# Patient Record
Sex: MALE | Race: WHITE | Employment: FULL TIME | URBAN - METROPOLITAN AREA
[De-identification: names, ages, dates, MRNs, and addresses within clinical notes are randomized per-mention and may not be internally consistent; named-entity substitution may affect disease eponyms.]

---

## 2020-10-03 ENCOUNTER — HOSPITAL ENCOUNTER (EMERGENCY)
Age: 30
Discharge: HOME | End: 2020-10-03
Attending: EMERGENCY MEDICINE

## 2020-10-03 VITALS
HEIGHT: 70 IN | RESPIRATION RATE: 16 BRPM | DIASTOLIC BLOOD PRESSURE: 85 MMHG | TEMPERATURE: 98 F | HEART RATE: 103 BPM | OXYGEN SATURATION: 98 % | BODY MASS INDEX: 23.62 KG/M2 | WEIGHT: 165 LBS | SYSTOLIC BLOOD PRESSURE: 149 MMHG

## 2020-10-03 DIAGNOSIS — L02.416 CELLULITIS AND ABSCESS OF LEFT LEG: Primary | ICD-10-CM

## 2020-10-03 DIAGNOSIS — L03.116 CELLULITIS AND ABSCESS OF LEFT LEG: Primary | ICD-10-CM

## 2020-10-03 PROCEDURE — 99282 EMERGENCY DEPT VISIT SF MDM: CPT

## 2020-10-03 PROCEDURE — 75810000289 HC I&D ABSCESS SIMP/COMP/MULT

## 2020-10-03 RX ORDER — MELOXICAM 15 MG/1
15 TABLET ORAL
Qty: 10 TAB | Refills: 0 | Status: SHIPPED | OUTPATIENT
Start: 2020-10-03

## 2020-10-03 RX ORDER — CLINDAMYCIN HYDROCHLORIDE 150 MG/1
300 CAPSULE ORAL EVERY 6 HOURS
Qty: 56 CAP | Refills: 0 | Status: SHIPPED | OUTPATIENT
Start: 2020-10-03 | End: 2020-10-10

## 2020-10-04 NOTE — ED NOTES
I have reviewed discharge instructions with the patient. The patient verbalized understanding. Patient left ED via Discharge Method: ambulatory to Home with (self). Opportunity for questions and clarification provided. Patient given 2 scripts. To continue your aftercare when you leave the hospital, you may receive an automated call from our care team to check in on how you are doing. This is a free service and part of our promise to provide the best care and service to meet your aftercare needs.  If you have questions, or wish to unsubscribe from this service please call [phone]. Thank you for Choosing our [facility] Emergency Department.

## 2020-10-04 NOTE — ED PROVIDER NOTES
With abscess on his left knee. Patient states he has been there for about a week and gradually getting worse. He has tried to open the wound and it has drained some. He denies any injury. He states it started as an ingrown hair. He is a . He states the swelling and pain make it painful to bend his knee but he has no difficulty doing range of motion when asked. The history is provided by the patient. Skin Problem    This is a new problem. The current episode started more than 1 week ago. The problem has been gradually worsening. There has been no fever. No past medical history on file. No past surgical history on file. No family history on file.     Social History     Socioeconomic History    Marital status: SINGLE     Spouse name: Not on file    Number of children: Not on file    Years of education: Not on file    Highest education level: Not on file   Occupational History    Not on file   Social Needs    Financial resource strain: Not on file    Food insecurity     Worry: Not on file     Inability: Not on file    Transportation needs     Medical: Not on file     Non-medical: Not on file   Tobacco Use    Smoking status: Not on file   Substance and Sexual Activity    Alcohol use: Not on file    Drug use: Not on file    Sexual activity: Not on file   Lifestyle    Physical activity     Days per week: Not on file     Minutes per session: Not on file    Stress: Not on file   Relationships    Social connections     Talks on phone: Not on file     Gets together: Not on file     Attends Worship service: Not on file     Active member of club or organization: Not on file     Attends meetings of clubs or organizations: Not on file     Relationship status: Not on file    Intimate partner violence     Fear of current or ex partner: Not on file     Emotionally abused: Not on file     Physically abused: Not on file     Forced sexual activity: Not on file   Other Topics Concern    Not on file   Social History Narrative    Not on file         ALLERGIES: Patient has no known allergies. Review of Systems   Constitutional: Negative for chills and fever. Musculoskeletal: Negative for joint swelling. Skin: Positive for color change and wound. All other systems reviewed and are negative. Vitals:    10/03/20 2137   BP: (!) 149/85   Pulse: (!) 103   Resp: 16   Temp: 98 °F (36.7 °C)   SpO2: 98%   Weight: 74.8 kg (165 lb)   Height: 5' 10\" (1.778 m)            Physical Exam  Vitals signs and nursing note reviewed. Constitutional:       General: He is not in acute distress. Appearance: Normal appearance. He is well-developed. He is not ill-appearing or diaphoretic. HENT:      Head: Normocephalic and atraumatic. Eyes:      General:         Right eye: No discharge. Left eye: No discharge. Conjunctiva/sclera: Conjunctivae normal.   Neck:      Musculoskeletal: Normal range of motion and neck supple. Abdominal:      Tenderness: There is no abdominal tenderness. Musculoskeletal: Normal range of motion. General: Swelling and tenderness present. Left lower leg: No edema. Comments: He has a 2 cm abscess on the skin overlying the left patella. He has some mild erythema surrounding the abscess with some mild distal erythema into the left lower extremity. There is no extensive swelling. Abscess has some drainage but still underlying fluctuance. There is full range of motion of his left knee   Skin:     General: Skin is warm and dry. Capillary Refill: Capillary refill takes less than 2 seconds. Neurological:      Mental Status: He is alert and oriented to person, place, and time. Cranial Nerves: No cranial nerve deficit.    Psychiatric:         Behavior: Behavior normal.          MDM  Number of Diagnoses or Management Options  Cellulitis and abscess of left leg:   Diagnosis management comments: Abscess was I&D patient was told to return 2 days for reevaluation. He was given clindamycin. Told to stay off his knees during his work as a . He has full range of motion of the knee I do not think he has a septic joint. Risk of Complications, Morbidity, and/or Mortality  Presenting problems: moderate  Diagnostic procedures: low  Management options: moderate    Patient Progress  Patient progress: improved         I&D Abcess Simple    Date/Time: 10/3/2020 10:30 PM  Performed by: Eldon Hoyt MD  Authorized by: Eldon Hoyt MD     Consent:     Consent obtained:  Verbal    Consent given by:  Patient  Location:     Type:  Abscess    Size:  2 cm    Location:  Lower extremity    Lower extremity location:  Knee    Knee location:  L knee  Pre-procedure details:     Skin preparation:  Betadine  Anesthesia (see MAR for exact dosages): Anesthesia method:  Local infiltration    Local anesthetic:  Lidocaine 1% WITH epi  Procedure type:     Complexity:  Simple  Procedure details:     Needle aspiration: no      Incision types:  Single straight    Incision depth:  Subcutaneous    Scalpel blade:  11    Wound management:  Probed and deloculated    Drainage:  Purulent    Drainage amount: Moderate    Wound treatment:  Wound left open    Packing materials:  None  Post-procedure details:     Patient tolerance of procedure:   Tolerated well, no immediate complications

## 2020-10-04 NOTE — ED TRIAGE NOTES
Arrives with face mask in place. Reports left knee pain and swelling with wound to site. States drained multiple times over past week. Onset approx 1 week pta. Denies fever/chills. Redness and swelling to site.

## 2020-10-24 ENCOUNTER — HOSPITAL ENCOUNTER (EMERGENCY)
Age: 30
Discharge: HOME | End: 2020-10-24
Attending: EMERGENCY MEDICINE

## 2020-10-24 ENCOUNTER — APPOINTMENT (OUTPATIENT)
Dept: GENERAL RADIOLOGY | Age: 30
End: 2020-10-24
Attending: EMERGENCY MEDICINE

## 2020-10-24 VITALS
RESPIRATION RATE: 20 BRPM | WEIGHT: 175 LBS | BODY MASS INDEX: 25.05 KG/M2 | OXYGEN SATURATION: 99 % | HEIGHT: 70 IN | DIASTOLIC BLOOD PRESSURE: 90 MMHG | SYSTOLIC BLOOD PRESSURE: 149 MMHG | HEART RATE: 124 BPM | TEMPERATURE: 98.4 F

## 2020-10-24 DIAGNOSIS — T40.1X4A HEROIN OVERDOSE, UNDETERMINED INTENT, INITIAL ENCOUNTER (HCC): Primary | ICD-10-CM

## 2020-10-24 LAB
ALBUMIN SERPL-MCNC: 4.5 G/DL (ref 3.5–5)
ALBUMIN/GLOB SERPL: 1 {RATIO} (ref 1.2–3.5)
ALP SERPL-CCNC: 54 U/L (ref 50–136)
ALT SERPL-CCNC: 25 U/L (ref 12–65)
ANION GAP SERPL CALC-SCNC: 5 MMOL/L (ref 7–16)
AST SERPL-CCNC: 49 U/L (ref 15–37)
ATRIAL RATE: 124 BPM
BASOPHILS # BLD: 0.1 K/UL (ref 0–0.2)
BASOPHILS NFR BLD: 0 % (ref 0–2)
BILIRUB SERPL-MCNC: 1.4 MG/DL (ref 0.2–1.1)
BUN SERPL-MCNC: 16 MG/DL (ref 6–23)
CALCIUM SERPL-MCNC: 9.2 MG/DL (ref 8.3–10.4)
CALCULATED P AXIS, ECG09: 69 DEGREES
CALCULATED R AXIS, ECG10: 108 DEGREES
CALCULATED T AXIS, ECG11: 58 DEGREES
CHLORIDE SERPL-SCNC: 106 MMOL/L (ref 98–107)
CO2 SERPL-SCNC: 27 MMOL/L (ref 21–32)
CREAT SERPL-MCNC: 1.43 MG/DL (ref 0.8–1.5)
DIAGNOSIS, 93000: NORMAL
DIFFERENTIAL METHOD BLD: ABNORMAL
EOSINOPHIL # BLD: 0 K/UL (ref 0–0.8)
EOSINOPHIL NFR BLD: 0 % (ref 0.5–7.8)
ERYTHROCYTE [DISTWIDTH] IN BLOOD BY AUTOMATED COUNT: 13.2 % (ref 11.9–14.6)
ETHANOL SERPL-MCNC: 5 MG/DL
GLOBULIN SER CALC-MCNC: 4.6 G/DL (ref 2.3–3.5)
GLUCOSE SERPL-MCNC: 161 MG/DL (ref 65–100)
HCT VFR BLD AUTO: 42.1 % (ref 41.1–50.3)
HGB BLD-MCNC: 14.4 G/DL (ref 13.6–17.2)
IMM GRANULOCYTES # BLD AUTO: 0.2 K/UL (ref 0–0.5)
IMM GRANULOCYTES NFR BLD AUTO: 1 % (ref 0–5)
LYMPHOCYTES # BLD: 1.1 K/UL (ref 0.5–4.6)
LYMPHOCYTES NFR BLD: 6 % (ref 13–44)
MAGNESIUM SERPL-MCNC: 2.3 MG/DL (ref 1.8–2.4)
MCH RBC QN AUTO: 29.9 PG (ref 26.1–32.9)
MCHC RBC AUTO-ENTMCNC: 34.2 G/DL (ref 31.4–35)
MCV RBC AUTO: 87.3 FL (ref 79.6–97.8)
MONOCYTES # BLD: 1.1 K/UL (ref 0.1–1.3)
MONOCYTES NFR BLD: 6 % (ref 4–12)
NEUTS SEG # BLD: 15.7 K/UL (ref 1.7–8.2)
NEUTS SEG NFR BLD: 86 % (ref 43–78)
NRBC # BLD: 0 K/UL (ref 0–0.2)
P-R INTERVAL, ECG05: 142 MS
PLATELET # BLD AUTO: 219 K/UL (ref 150–450)
PMV BLD AUTO: 10.8 FL (ref 9.4–12.3)
POTASSIUM SERPL-SCNC: 4.9 MMOL/L (ref 3.5–5.1)
PROT SERPL-MCNC: 9.1 G/DL (ref 6.3–8.2)
Q-T INTERVAL, ECG07: 318 MS
QRS DURATION, ECG06: 100 MS
QTC CALCULATION (BEZET), ECG08: 456 MS
RBC # BLD AUTO: 4.82 M/UL (ref 4.23–5.6)
SODIUM SERPL-SCNC: 138 MMOL/L (ref 136–145)
VENTRICULAR RATE, ECG03: 124 BPM
WBC # BLD AUTO: 18.2 K/UL (ref 4.3–11.1)

## 2020-10-24 PROCEDURE — 85025 COMPLETE CBC W/AUTO DIFF WBC: CPT

## 2020-10-24 PROCEDURE — 80307 DRUG TEST PRSMV CHEM ANLYZR: CPT

## 2020-10-24 PROCEDURE — 93005 ELECTROCARDIOGRAM TRACING: CPT | Performed by: EMERGENCY MEDICINE

## 2020-10-24 PROCEDURE — 83735 ASSAY OF MAGNESIUM: CPT

## 2020-10-24 PROCEDURE — 80053 COMPREHEN METABOLIC PANEL: CPT

## 2020-10-24 PROCEDURE — 71045 X-RAY EXAM CHEST 1 VIEW: CPT

## 2020-10-24 PROCEDURE — 74011250636 HC RX REV CODE- 250/636: Performed by: EMERGENCY MEDICINE

## 2020-10-24 PROCEDURE — 99285 EMERGENCY DEPT VISIT HI MDM: CPT

## 2020-10-24 RX ADMIN — SODIUM CHLORIDE 1000 ML: 900 INJECTION, SOLUTION INTRAVENOUS at 01:26

## 2020-10-24 NOTE — ED TRIAGE NOTES
Found about [phone] lying supine in a parking lot of a convenience store. Patient diaphoretic, shallow breathing. EMS on scene gave 2mg IM narcan. Patient arose within 5 min and now aox4. Patient wearing mask. The beqom present at bedside. Warrants out for patient. Patient states he snorted heroin.

## 2020-10-24 NOTE — ED NOTES
I have reviewed discharge instructions with the patient. The patient verbalized understanding. Patient left ED via Discharge Method: ambulatory with . DC to detention. Opportunity for questions and clarification provided. Patient given 0 scripts. To continue your aftercare when you leave the hospital, you may receive an automated call from our care team to check in on how you are doing. This is a free service and part of our promise to provide the best care and service to meet your aftercare needs.  If you have questions, or wish to unsubscribe from this service please call [phone]. Thank you for Choosing our [facility] Emergency Department.

## 2020-10-24 NOTE — ED PROVIDER NOTES
Patient presents to the ER for presumed opiate overdose. EMS was called to find patient unresponsive, was treated with 2 mg of Narcan IV approximately an hour prior to arrival with immediate improvement in symptoms. Patient reports he did snort heroin approximately $2 worth prior to this happening. Reports alcohol intake as well. Denies any other drug intake. EMS reports vital signs stable in route after being administered Narcan other than tachycardia. Patient denies any other prescription drug use. The history is provided by the patient. Drug Overdose   This is a new problem. The current episode started 1 to 2 hours ago. Associated symptoms include shortness of breath. Pertinent negatives include no chest pain and no abdominal pain. He has tried nothing for the symptoms. No past medical history on file. No past surgical history on file. No family history on file.     Social History     Socioeconomic History    Marital status: SINGLE     Spouse name: Not on file    Number of children: Not on file    Years of education: Not on file    Highest education level: Not on file   Occupational History    Not on file   Social Needs    Financial resource strain: Not on file    Food insecurity     Worry: Not on file     Inability: Not on file    Transportation needs     Medical: Not on file     Non-medical: Not on file   Tobacco Use    Smoking status: Not on file   Substance and Sexual Activity    Alcohol use: Not on file    Drug use: Not on file    Sexual activity: Not on file   Lifestyle    Physical activity     Days per week: Not on file     Minutes per session: Not on file    Stress: Not on file   Relationships    Social connections     Talks on phone: Not on file     Gets together: Not on file     Attends Quaker service: Not on file     Active member of club or organization: Not on file     Attends meetings of clubs or organizations: Not on file     Relationship status: Not on file    Intimate partner violence     Fear of current or ex partner: Not on file     Emotionally abused: Not on file     Physically abused: Not on file     Forced sexual activity: Not on file   Other Topics Concern    Not on file   Social History Narrative    Not on file         ALLERGIES: Patient has no known allergies. Review of Systems   Constitutional: Negative for fatigue and unexpected weight change. HENT: Negative for congestion. Eyes: Negative for photophobia and redness. Respiratory: Positive for shortness of breath. Negative for chest tightness. Cardiovascular: Negative for chest pain. Gastrointestinal: Negative for abdominal pain. Endocrine: Negative for polyphagia and polyuria. All other systems reviewed and are negative. There were no vitals filed for this visit. Physical Exam  Vitals signs and nursing note reviewed. Constitutional:       Appearance: Normal appearance. HENT:      Head: Normocephalic and atraumatic. Nose: Nose normal.      Mouth/Throat:      Mouth: Mucous membranes are moist.      Pharynx: No oropharyngeal exudate or posterior oropharyngeal erythema. Eyes:      Extraocular Movements: Extraocular movements intact. Conjunctiva/sclera: Conjunctivae normal.      Pupils: Pupils are equal, round, and reactive to light. Neck:      Musculoskeletal: Normal range of motion. No neck rigidity or muscular tenderness. Vascular: No carotid bruit. Cardiovascular:      Rate and Rhythm: Regular rhythm. Tachycardia present. Pulses: Normal pulses. Heart sounds: Normal heart sounds. No murmur. No gallop. Pulmonary:      Effort: Pulmonary effort is normal. No respiratory distress. Breath sounds: Normal breath sounds. No stridor. No rhonchi. Abdominal:      General: Abdomen is flat. Bowel sounds are normal. There is no distension. Palpations: Abdomen is soft. Tenderness: There is no abdominal tenderness. Musculoskeletal: Normal range of motion. General: No swelling, tenderness or deformity. Right lower leg: No edema. Left lower leg: No edema. Lymphadenopathy:      Cervical: No cervical adenopathy. Skin:     General: Skin is warm and dry. Capillary Refill: Capillary refill takes less than 2 seconds. Coloration: Skin is not jaundiced. Neurological:      General: No focal deficit present. Mental Status: He is alert. Mental status is at baseline. MDM  Number of Diagnoses or Management Options  Diagnosis management comments: Patient appears to be in a sinus tachycardia. Plan to obtain basic labs, start IV fluids, obtain UDS. Obtain chest x-ray as well. Monitor symptoms here    2:16 AM  Labs show white count of 18,000. Chest x-ray clear without any infiltrates. Patient remains well-appearing here without any hypoxia. Plan to discharge home, given heroin overdose precautions.          Amount and/or Complexity of Data Reviewed  Clinical lab tests: ordered and reviewed  Tests in the radiology section of CPT®: ordered and reviewed    Risk of Complications, Morbidity, and/or Mortality  Presenting problems: high  Diagnostic procedures: moderate  Management options: moderate    Patient Progress  Patient progress: stable         Procedures        Results Include:    Recent Results (from the past 24 hour(s))   CBC WITH AUTOMATED DIFF    Collection Time: 10/24/20  1:22 AM   Result Value Ref Range    WBC 18.2 (H) 4.3 - 11.1 K/uL    RBC 4.82 4.23 - 5.6 M/uL    HGB 14.4 13.6 - 17.2 g/dL    HCT 42.1 41.1 - 50.3 %    MCV 87.3 79.6 - 97.8 FL    MCH 29.9 26.1 - 32.9 PG    MCHC 34.2 31.4 - 35.0 g/dL    RDW 13.2 11.9 - 14.6 %    PLATELET 084 454 - 930 K/uL    MPV 10.8 9.4 - 12.3 FL    ABSOLUTE NRBC 0.00 0.0 - 0.2 K/uL    DF AUTOMATED      NEUTROPHILS 86 (H) 43 - 78 %    LYMPHOCYTES 6 (L) 13 - 44 %    MONOCYTES 6 4.0 - 12.0 %    EOSINOPHILS 0 (L) 0.5 - 7.8 %    BASOPHILS 0 0.0 - 2.0 %    IMMATURE GRANULOCYTES 1 0.0 - 5.0 %    ABS. NEUTROPHILS 15.7 (H) 1.7 - 8.2 K/UL    ABS. LYMPHOCYTES 1.1 0.5 - 4.6 K/UL    ABS. MONOCYTES 1.1 0.1 - 1.3 K/UL    ABS. EOSINOPHILS 0.0 0.0 - 0.8 K/UL    ABS. BASOPHILS 0.1 0.0 - 0.2 K/UL    ABS. IMM. GRANS. 0.2 0.0 - 0.5 K/UL   METABOLIC PANEL, COMPREHENSIVE    Collection Time: 10/24/20  1:22 AM   Result Value Ref Range    Sodium 138 136 - 145 mmol/L    Potassium 4.9 3.5 - 5.1 mmol/L    Chloride 106 98 - 107 mmol/L    CO2 27 21 - 32 mmol/L    Anion gap 5 (L) 7 - 16 mmol/L    Glucose 161 (H) 65 - 100 mg/dL    BUN 16 6 - 23 MG/DL    Creatinine 1.43 0.8 - 1.5 MG/DL    GFR est AA >60 >60 ml/min/1.73m2    GFR est non-AA >60 >60 ml/min/1.73m2    Calcium 9.2 8.3 - 10.4 MG/DL    Bilirubin, total 1.4 (H) 0.2 - 1.1 MG/DL    ALT (SGPT) 25 12 - 65 U/L    AST (SGOT) 49 (H) 15 - 37 U/L    Alk. phosphatase 54 50 - 136 U/L    Protein, total 9.1 (H) 6.3 - 8.2 g/dL    Albumin 4.5 3.5 - 5.0 g/dL    Globulin 4.6 (H) 2.3 - 3.5 g/dL    A-G Ratio 1.0 (L) 1.2 - 3.5     MAGNESIUM    Collection Time: 10/24/20  1:22 AM   Result Value Ref Range    Magnesium 2.3 1.8 - 2.4 mg/dL   ETHYL ALCOHOL    Collection Time: 10/24/20  1:22 AM   Result Value Ref Range    ALCOHOL(ETHYL),SERUM 5 MG/DL     Voice dictation software was used during the making of this note. This software is not perfect and grammatical and other typographical errors may be present. This note has been proofread, but may still contain errors.   Broderick Nichole MD; 10/24/2020 @2:17 AM   ===================================================================

## 2020-10-24 NOTE — DISCHARGE INSTRUCTIONS
Follow-up with outpatient drug treatment center  Return to the ER for any new, worsening or life-threatening symptoms      Opioid Overdose: Care Instructions  Your Care Instructions     You have had treatment to help your body recover from taking too much of an opioid. You are getting better, but you may not feel well for a while. It takes time for the opioids to leave your body. How long it takes to feel better depends on which drug you took and how much you took of it. Opioids include illegal drugs such as heroin, often called smack, junk, H, and ska. Opioids also include medicines that doctors prescribe to treat pain. These are medicines such as oxycodone, methadone, and buprenorphine. They are sometimes sold and used illegally. Taking too much of an opioid can be dangerous. It may cause:  · Trouble breathing. · Low blood pressure. · A low heart rate. · A coma. When the doctor treated you for the overdose, he or she may have:  · Watched your symptoms or done tests to find out what kind of drug you took. · Given you fluids. · Given you oxygen to help you breathe. · Given you a medicine called naloxone to help reverse the effects of the opioid. · Done several tests, including blood tests, to see how you're responding to treatment. The doctor also watched you carefully to make sure you were recovering safely. Follow-up care is a key part of your treatment and safety. Be sure to make and go to all appointments, and call your doctor if you are having problems. It's also a good idea to know your test results and keep a list of the medicines you take. How can you care for yourself at home? · If you take opioids regularly, your body gets used to them. This is called physical dependence. If you are physically dependent on opioids, you may have withdrawal symptoms when you stop using them or use less. These symptoms can include nausea, sweating, chills, diarrhea, stomach cramps, and muscle aches. Withdrawal can last up to several weeks, depending on which opioid you took and how long you took it. You may feel very ill, but you probably aren't in medical danger. · Your doctor may give you medicine to help you feel better. To help you get through withdrawal, you can also:  ? Get plenty of rest.  ? Drink plenty of fluids. ? Stay active. ? Eat a healthy diet. · If you had a tube in your throat to help you breathe, you may have a sore throat or hoarseness that can last a few days. Sip liquids to help soothe your throat. · Do not drink alcohol or take illegal drugs. · Do not take medicines that make you tired, like sleeping pills or muscle relaxers. · Do not drive if you feel sleepy or groggy while you recover from an overdose. · Get help to stop using opioids. Talk to your doctor about substance use treatment programs. · Talk to your doctor or pharmacist about having a naloxone rescue kit on hand. When should you call for help? Call 911 anytime you think you may need emergency care. For example, call if:    · You feel you cannot stop from hurting yourself or someone else. Call your doctor now or seek immediate medical care if:    · You have new or worse withdrawal symptoms, such as:  ? Stomach cramps. ? Vomiting. ? Diarrhea. ? Muscle aches. ? Sweating. Watch closely for changes in your health, and be sure to contact your doctor if:    · You do not get better as expected. Where can you learn more? Go to http://www.gray.com/  Enter Z171 in the search box to learn more about \"Opioid Overdose: Care Instructions. \"  Current as of: June 29, 2020               Content Version: 12.6  © 7925-2941 [employer]. Care instructions adapted under license by [employer] (which disclaims liability or warranty for this information).  If you have questions about a medical condition or this instruction, always ask your healthcare professional. [employer] disclaims any warranty or liability for your use of this information.

## 2021-05-01 NOTE — DISCHARGE INSTRUCTIONS
Patient Education        Skin Abscess: Care Instructions  Your Care Instructions     A skin abscess is a bacterial infection that forms a pocket of pus. A boil is a kind of skin abscess. The doctor may have cut an opening in the abscess so that the pus can drain out. You may have gauze in the cut so that the abscess will stay open and keep draining. You may need antibiotics. You will need to follow up with your doctor to make sure the infection has gone away. The doctor has checked you carefully, but problems can develop later. If you notice any problems or new symptoms, get medical treatment right away. Follow-up care is a key part of your treatment and safety. Be sure to make and go to all appointments, and call your doctor if you are having problems. It's also a good idea to know your test results and keep a list of the medicines you take. How can you care for yourself at home? · Apply warm and dry compresses, a heating pad set on low, or a hot water bottle 3 or 4 times a day for pain. Keep a cloth between the heat source and your skin. · If your doctor prescribed antibiotics, take them as directed. Do not stop taking them just because you feel better. You need to take the full course of antibiotics. · Take pain medicines exactly as directed. ? If the doctor gave you a prescription medicine for pain, take it as prescribed. ? If you are not taking a prescription pain medicine, ask your doctor if you can take an over-the-counter medicine. · Keep your bandage clean and dry. Change the bandage whenever it gets wet or dirty, or at least one time a day. · If the abscess was packed with gauze:  ? Keep follow-up appointments to have the gauze changed or removed. If the doctor instructed you to remove the gauze, follow the instructions you were given for how to remove it. ? After the gauze is removed, soak the area in warm water for 15 to 20 minutes 2 times a day, until the wound closes.   When should you call for help? Call your doctor now or seek immediate medical care if:    · You have signs of worsening infection, such as:  ? Increased pain, swelling, warmth, or redness. ? Red streaks leading from the infected skin. ? Pus draining from the wound. ? A fever. Watch closely for changes in your health, and be sure to contact your doctor if:    · You do not get better as expected. Where can you learn more? Go to http://www.gray.com/  Enter S261 in the search box to learn more about \"Skin Abscess: Care Instructions. \"  Current as of: July 2, 2020               Content Version: 12.6  © 8070-8229 [employer]. Care instructions adapted under license by [employer] (which disclaims liability or warranty for this information). If you have questions about a medical condition or this instruction, always ask your healthcare professional. [place] [address] any warranty or liability for your use of this information. Patient Education        Cellulitis: Care Instructions  Your Care Instructions     Cellulitis is a skin infection caused by bacteria, most often strep or staph. It often occurs after a break in the skin from a scrape, cut, bite, or puncture, or after a rash. Cellulitis may be treated without doing tests to find out what caused it. But your doctor may do tests, if needed, to look for a specific bacteria, like methicillin-resistant Staphylococcus aureus (MRSA). The doctor has checked you carefully, but problems can develop later. If you notice any problems or new symptoms, get medical treatment right away. Follow-up care is a key part of your treatment and safety. Be sure to make and go to all appointments, and call your doctor if you are having problems. It's also a good idea to know your test results and keep a list of the medicines you take. How can you care for yourself at home? · Take your antibiotics as directed.  Do not stop taking them just because you feel better. You need to take the full course of antibiotics. · Prop up the infected area on pillows to reduce pain and swelling. Try to keep the area above the level of your heart as often as you can. · If your doctor told you how to care for your wound, follow your doctor's instructions. If you did not get instructions, follow this general advice:  ? Wash the wound with clean water 2 times a day. Don't use hydrogen peroxide or alcohol, which can slow healing. ? You may cover the wound with a thin layer of petroleum jelly, such as Vaseline, and a nonstick bandage. ? Apply more petroleum jelly and replace the bandage as needed. · Be safe with medicines. Take pain medicines exactly as directed. ? If the doctor gave you a prescription medicine for pain, take it as prescribed. ? If you are not taking a prescription pain medicine, ask your doctor if you can take an over-the-counter medicine. To prevent cellulitis in the future  · Try to prevent cuts, scrapes, or other injuries to your skin. Cellulitis most often occurs where there is a break in the skin. · If you get a scrape, cut, mild burn, or bite, wash the wound with clean water as soon as you can to help avoid infection. Don't use hydrogen peroxide or alcohol, which can slow healing. · If you have swelling in your legs (edema), support stockings and good skin care may help prevent leg sores and cellulitis. · Take care of your feet, especially if you have diabetes or other conditions that increase the risk of infection. Wear shoes and socks. Do not go barefoot. If you have athlete's foot or other skin problems on your feet, talk to your doctor about how to treat them. When should you call for help? Call your doctor now or seek immediate medical care if:    · You have signs that your infection is getting worse, such as:  ? Increased pain, swelling, warmth, or redness. ? Red streaks leading from the area.   ? Pus draining from the area. ? A fever.     · You get a rash. Watch closely for changes in your health, and be sure to contact your doctor if:    · You do not get better as expected. Where can you learn more? Go to http://www.gray.com/  Enter X309 in the search box to learn more about \"Cellulitis: Care Instructions. \"  Current as of: July 2, 2020               Content Version: 12.6  © 2006-2020 [employer]. Care instructions adapted under license by [employer] (which disclaims liability or warranty for this information). If you have questions about a medical condition or this instruction, always ask your healthcare professional. [address] any warranty or liability for your use of this information. no

## 2024-05-01 ENCOUNTER — HOSPITAL ENCOUNTER (EMERGENCY)
Age: 34
Discharge: HOME | End: 2024-05-01

## 2024-05-01 VITALS
HEART RATE: 70 BPM | DIASTOLIC BLOOD PRESSURE: 70 MMHG | OXYGEN SATURATION: 99 % | BODY MASS INDEX: 26.63 KG/M2 | TEMPERATURE: 98.1 F | SYSTOLIC BLOOD PRESSURE: 146 MMHG | RESPIRATION RATE: 16 BRPM | HEIGHT: 70 IN | WEIGHT: 186 LBS

## 2024-05-01 DIAGNOSIS — K08.89 PAIN, DENTAL: Primary | ICD-10-CM

## 2024-05-01 PROCEDURE — 99283 EMERGENCY DEPT VISIT LOW MDM: CPT

## 2024-05-01 RX ORDER — LIDOCAINE HYDROCHLORIDE 20 MG/ML
15 SOLUTION OROPHARYNGEAL PRN
Qty: 100 ML | Refills: 0 | Status: SHIPPED | OUTPATIENT
Start: 2024-05-01

## 2024-05-01 RX ORDER — CLINDAMYCIN HYDROCHLORIDE 300 MG/1
300 CAPSULE ORAL 2 TIMES DAILY
Qty: 14 CAPSULE | Refills: 0 | Status: SHIPPED | OUTPATIENT
Start: 2024-05-01 | End: 2024-05-08

## 2024-05-01 ASSESSMENT — LIFESTYLE VARIABLES
HOW OFTEN DO YOU HAVE A DRINK CONTAINING ALCOHOL: NEVER
HOW MANY STANDARD DRINKS CONTAINING ALCOHOL DO YOU HAVE ON A TYPICAL DAY: PATIENT DOES NOT DRINK

## 2024-05-01 ASSESSMENT — PAIN DESCRIPTION - PAIN TYPE: TYPE: ACUTE PAIN

## 2024-05-01 ASSESSMENT — PAIN - FUNCTIONAL ASSESSMENT
PAIN_FUNCTIONAL_ASSESSMENT: 0-10
PAIN_FUNCTIONAL_ASSESSMENT: 0-10

## 2024-05-01 ASSESSMENT — PAIN DESCRIPTION - ORIENTATION: ORIENTATION: LEFT

## 2024-05-01 ASSESSMENT — PAIN SCALES - GENERAL
PAINLEVEL_OUTOF10: 7
PAINLEVEL_OUTOF10: 7

## 2024-05-01 ASSESSMENT — PAIN SCALES - WONG BAKER
WONGBAKER_NUMERICALRESPONSE: NO HURT
WONGBAKER_NUMERICALRESPONSE: NO HURT

## 2024-05-01 ASSESSMENT — PAIN DESCRIPTION - LOCATION: LOCATION: TEETH

## 2024-05-01 NOTE — ED TRIAGE NOTES
Patient to triage with c/o upper left dental pain. States he has an abscess that started 2-3 days ago.

## 2024-05-02 NOTE — ED NOTES
Patient mobility status  with no difficulty. Provider aware     I have reviewed discharge instructions with the patient.  The patient verbalized understanding.    Patient left ED via Discharge Method: ambulatory to Home with self.    Opportunity for questions and clarification provided.     Patient given 2 scripts.

## 2024-05-02 NOTE — DISCHARGE INSTRUCTIONS
Begin taking antibiotics as prescribed.  Use lidocaine mouthwash as needed for pain relief.  Contact dentist in the region to get close follow-up.  Refer to the dental resources below as needed.  As discussed, [facility] does not possess oral maxillofacial surgery abilities.  If your condition happens to worsen with any development of worsening swelling, fevers, difficulty breathing/difficulty swallowing, or worsening of condition, follow-up care with [facility] or [facility].    Dental Services     The Emergency Department is not able to provide dental services - tooth extractions, root canal. Though we can provide antibiotics for abccess or infection. Listed below are free or low-cost options.    [facility] [address] [place] [address]   [phone]  Tuesday and Thursday- registration starts at 10am. After registering you are given a time to return  Provides free x-rays, extractions, treatment of infection, and dental hygeine.   Cannot have medicare, medicaid or other medical insurance coverage  Bring proof of [facility]** residency (power bill, for example), Social Security Number and household income documents (including food stamps) as well as any current medications.    (**if you do not live in [facility], contact the free [facility] in your home county for the availability of dental services)    [address] [address] [phone]  Monday and Wednesday 8a-5p  Tuesday and Thursday    8a-7p  Friday                               12-5p  Provides Adult and Childrens services. X-rays, extractions, treatment of infection, restorative care  Accepts medicaid, private insurance, self-pay. Fees are on a sliding scale based on income (need to bring documentation)    [facility] [address] [place] [id number]     [phone]  Monday - Friday 8am-5p  Accepts medicaid for dental (but

## 2024-05-02 NOTE — ED PROVIDER NOTES
capsule by mouth 2 times daily for 7 days, Disp-14 capsule, R-0Print      lidocaine viscous hcl (XYLOCAINE) 2 % SOLN solution Take 15 mLs by mouth as needed for Irritation, Disp-100 mL, R-0Print              History reviewed. No pertinent past medical history.     History reviewed. No pertinent surgical history.     Social History     Socioeconomic History    Marital status: Single     Spouse name: None    Number of children: None    Years of education: None    Highest education level: None   Tobacco Use    Smoking status: Never    Smokeless tobacco: Never   Substance and Sexual Activity    Alcohol use: Never    Drug use: Not Currently        Discharge Medication List as of 5/1/2024  8:32 PM        CONTINUE these medications which have NOT CHANGED    Details   meloxicam (MOBIC) 15 MG tablet Take 15 mg by mouth daily as neededHistorical Med              No results found for any visits on 05/01/24.      No orders to display                No results for input(s): \"COVID19\" in the last 72 hours.     Voice dictation software was used during the making of this note.  This software is not perfect and grammatical and other typographical errors may be present.  This note has not been completely proofread for errors.     Ethan Ocampo PA  05/02/24 7510

## 2024-12-07 ENCOUNTER — HOSPITAL ENCOUNTER (EMERGENCY)
Age: 34
Discharge: HOME | End: 2024-12-08
Attending: EMERGENCY MEDICINE

## 2024-12-07 DIAGNOSIS — T40.601A OPIATE OVERDOSE, ACCIDENTAL OR UNINTENTIONAL, INITIAL ENCOUNTER (HCC): Primary | ICD-10-CM

## 2024-12-07 PROCEDURE — 6370000000 HC RX 637 (ALT 250 FOR IP): Performed by: EMERGENCY MEDICINE

## 2024-12-07 PROCEDURE — 99283 EMERGENCY DEPT VISIT LOW MDM: CPT

## 2024-12-07 RX ORDER — ONDANSETRON 4 MG/1
4 TABLET, ORALLY DISINTEGRATING ORAL
Status: COMPLETED | OUTPATIENT
Start: 2024-12-07 | End: 2024-12-07

## 2024-12-07 RX ADMIN — ONDANSETRON 4 MG: 4 TABLET, ORALLY DISINTEGRATING ORAL at 23:41

## 2024-12-08 VITALS
TEMPERATURE: 98 F | BODY MASS INDEX: 26.69 KG/M2 | HEART RATE: 63 BPM | DIASTOLIC BLOOD PRESSURE: 95 MMHG | RESPIRATION RATE: 16 BRPM | WEIGHT: 186 LBS | SYSTOLIC BLOOD PRESSURE: 128 MMHG | OXYGEN SATURATION: 98 %

## 2024-12-08 PROCEDURE — 6370000000 HC RX 637 (ALT 250 FOR IP): Performed by: EMERGENCY MEDICINE

## 2024-12-08 RX ADMIN — HYOSCYAMINE SULFATE 0.12 MG: 0.12 TABLET ORAL; SUBLINGUAL at 00:18

## 2024-12-08 ASSESSMENT — PAIN SCALES - GENERAL: PAINLEVEL_OUTOF10: 7

## 2024-12-08 NOTE — DISCHARGE INSTRUCTIONS
[facility]   [phone]   They have multiple resources to help you.  Please call.  [email or web address]     Other local resources that are available are:       [facility]    [phone]  [email or web address] for inpatient and outpatient substance abuse issues.    [facility] [phone]  Medication assisted treatment    [facility]  [phone]     Suicide Hotline   1-364-PJWOHSV     Narcotics Anonymous   www.na.org  Alcoholics Anonymous  www.aa.org

## 2024-12-08 NOTE — ED PROVIDER NOTES
normal.      Mouth/Throat:      Mouth: Mucous membranes are moist.   Eyes:      Extraocular Movements: Extraocular movements intact.      Pupils: Pupils are equal, round, and reactive to light.   Cardiovascular:      Rate and Rhythm: Normal rate and regular rhythm.   Pulmonary:      Effort: Pulmonary effort is normal. No respiratory distress.   Abdominal:      General: Abdomen is flat. There is no distension.      Tenderness: There is abdominal tenderness. There is no guarding.   Musculoskeletal:         General: No deformity. Normal range of motion.      Cervical back: Normal range of motion and neck supple.   Skin:     General: Skin is warm.      Coloration: Skin is pale.   Neurological:      General: No focal deficit present.      Mental Status: He is alert. Mental status is at baseline.   Psychiatric:         Mood and Affect: Mood normal.          Procedures     Procedures    Orders placed during this emergency department visit:   No orders of the defined types were placed in this encounter.       Medications given during this emergency department visit:     Medications   ondansetron (ZOFRAN-ODT) disintegrating tablet 4 mg (4 mg Oral Given 12/7/24 2341)   hyoscyamine (LEVSIN/SL) sublingual tablet 0.125 mg (0.125 mg SubLINGual Given 12/8/24 0018)       New prescriptions:     New Prescriptions    No medications on file        Past History and Complexity:     No past medical history on file.     No past surgical history on file.     Social History     Socioeconomic History    Marital status: Single   Tobacco Use    Smoking status: Never    Smokeless tobacco: Never   Substance and Sexual Activity    Alcohol use: Never    Drug use: Not Currently     Social Determinants of Health     Social Connections: Unknown (3/20/2021)    Received from [email or web address], [facility]    Social Connections     Frequency of Communication with Friends and Family: Not asked     Frequency of Social Gatherings with Friends and Family: Not

## 2024-12-08 NOTE — ED TRIAGE NOTES
Patient comes in custody of [facility], pt was found unconscious in his car, narcan given.  Pt reports fentanyl use.    Intranasal narcan given.